# Patient Record
Sex: FEMALE | Race: WHITE | NOT HISPANIC OR LATINO | URBAN - NONMETROPOLITAN AREA
[De-identification: names, ages, dates, MRNs, and addresses within clinical notes are randomized per-mention and may not be internally consistent; named-entity substitution may affect disease eponyms.]

---

## 2017-01-13 ENCOUNTER — OFFICE VISIT (OUTPATIENT)
Dept: RETAIL CLINIC | Facility: CLINIC | Age: 37
End: 2017-01-13

## 2017-01-13 VITALS
WEIGHT: 146 LBS | SYSTOLIC BLOOD PRESSURE: 120 MMHG | RESPIRATION RATE: 16 BRPM | DIASTOLIC BLOOD PRESSURE: 80 MMHG | TEMPERATURE: 98.2 F | HEART RATE: 104 BPM | OXYGEN SATURATION: 98 %

## 2017-01-13 DIAGNOSIS — Z20.818 EXPOSURE TO STREP THROAT: ICD-10-CM

## 2017-01-13 DIAGNOSIS — K12.1 STOMATITIS: Primary | ICD-10-CM

## 2017-01-13 LAB
EXPIRATION DATE: NORMAL
INTERNAL CONTROL: NORMAL
Lab: NORMAL
S PYO AG THROAT QL: NEGATIVE

## 2017-01-13 PROCEDURE — 99213 OFFICE O/P EST LOW 20 MIN: CPT | Performed by: NURSE PRACTITIONER

## 2017-01-13 PROCEDURE — 87880 STREP A ASSAY W/OPTIC: CPT | Performed by: NURSE PRACTITIONER

## 2017-01-13 RX ORDER — NORETHINDRONE ACETATE AND ETHINYL ESTRADIOL 1; 5 MG/1; UG/1
TABLET ORAL DAILY
COMMUNITY

## 2017-01-13 NOTE — MR AVS SNAPSHOT
Joceline Montoya   1/13/2017 8:30 AM   Office Visit    Dept Phone:  [phone]   Encounter #:  [id number]    Provider:  PROVIDER BEC SOMERSET   Department:  [facility]                Your Full Care Plan              Your Updated Medication List          This list is accurate as of: 1/13/17  8:51 AM.  Always use your most recent med list.                METHIMAZOLE PO       MULTIVITAMIN ADULT PO       norethindrone-ethinyl estradiol 1-5 MG-MCG tablet   Commonly known as:  FEMHRT 1/5       SOLUBLE FIBER/PROBIOTICS PO       ZANTAC PO       ZYRTEC ALLERGY PO               You Were Diagnosed With        Codes Comments    Stomatitis    -  Primary ICD-10-CM: K12.1  ICD-9-CM: 528.00       Instructions    Stomatitis  Stomatitis is a condition that causes inflammation in your mouth. It can affect a part of your mouth or your whole mouth. The condition often affects your cheek, teeth, gums, lips, and tongue. Stomatitis can also affect the mucous membranes that surround your mouth (mucosa).  Pain from stomatitis can make it hard for you to eat or drink. Severe cases of this condition can lead to dehydration or poor nutrition.  CAUSES  Common causes of this condition include:  · Viruses, such as cold sores or oral herpes and shingles.  · Canker sores.  · Bacterial infections.  · Fungus or yeast infections, such as oral thrush.  · Not getting adequate nutrition.  · Injury to your mouth. This can be from:    Dentures or braces that do not fit well.    Biting your tongue or cheek.    Burning your mouth.    Having sharp or broken teeth.  · Gum disease.  · Using tobacco, especially chewing tobacco.  · Allergies to foods, medicines, or substances that are used in your mouth.  · Medicines, including cancer medicines (chemotherapy), antihistamines, and seizure medicines.  In some cases, the cause may not be known.  RISK FACTORS  This condition is more likely to develop in people who:  · Have poor oral  hygiene or poor nutrition.  · Have any condition that causes a dry mouth.  · Are under a lot of physical or emotional stress.  · Have any condition that weakens the body's defense system (immune system).  · Are being treated for cancer.  · Smoke.  SYMPTOMS  The most common symptoms of this condition are pain, swelling, and redness inside your mouth. The pain may feel like burning or stinging. It may get worse from eating or drinking. Other symptoms include:  · Painful, shallow sores (ulcers) in the mouth.  · Blisters in the mouth.  · Bleeding gums.  · Swollen gums.  · Irritability and fatigue.  · Bad breath.  · Bad taste in the mouth.  · Fever.  DIAGNOSIS  This condition is diagnosed with a physical exam to check for bleeding gums and mouth ulcers. You may also have other tests, including:  · Blood tests to look for infection or vitamin deficiencies.  · Mouth swab to get a fluid sample to test for bacteria (culture).  · Tissue sample from an ulcer to examine under a microscope (biopsy).  TREATMENT  Treatment for stomatitis depends on the cause. Treatment may include medicines, such as:  · Over-the counter (OTC) pain medicines.  · Topical anesthetic to numb the area if you have severe pain.  · Antibiotics to treat a bacterial infection.  · Antifungals to treat a fungal infection.  · Antivirals to treat a viral infection.  · Mouth rinses that contain steroids to reduce the swelling in your mouth.  · Other medicines to coat or numb your mouth.  HOME CARE INSTRUCTIONS  Medicines  · Take medicines only as directed by your health care provider.  · If you were prescribed an antibiotic, finish all of it even if you start to feel better.  Lifestyle  · Practice good oral hygiene:    Gently brush your teeth with a soft, nylon-bristled toothbrush two times each day.    Floss your teeth every day.    Have your teeth cleaned regularly, as recommended by your dentist.  · Eat a balanced diet. Do not eat:    Spicy foods.    Citrus,  such as oranges.    Foods that have sharp edges, such as chips.  · Avoid any foods or other allergens that you think may be causing your stomatitis.  · If you have dentures, make sure that they are properly fitted.  · Do not use any tobacco products, including cigarettes, chewing tobacco, or electronic cigarettes. If you need help quitting, ask your health care provider.  · Find ways to reduce stress. Try yoga or meditation. Ask your health care provider for other ideas.  General Instructions  · Use a salt-water rinse for pain as directed by your health care provider. Mix 1 tsp of salt in 2 cups of water.  · Drink enough fluid to keep your urine clear or pale yellow. This will keep you hydrated.  SEEK MEDICAL CARE IF:  · Your symptoms get worse.  · You develop new symptoms, especially:    A rash.    New symptoms that do not involve your mouth area.  · Your symptoms last longer than three weeks.  · Your stomatitis goes away and then returns.  · You have a harder time eating and drinking normally.  · You have increasing fatigue or weakness.  · You lose your appetite or you feel nauseous.  · You have a fever.     This information is not intended to replace advice given to you by your health care provider. Make sure you discuss any questions you have with your health care provider.     Document Released: 10/14/2008 Document Revised: 05/03/2016 Document Reviewed: 12/14/2015  [employer] Interactive Patient Education ©2016 [employer]       Patient Instructions History      Upcoming Appointments     Visit Type Date Time Department    OFFICE VISIT 1/13/2017  8:30 AM [facility]      [employer] Signup     [facility] [employer] allows you to send messages to your doctor, view your test results, renew your prescriptions, schedule appointments, and more. To sign up, go to [employer] and click on the Sign Up Now link in the New User? box. Enter your [employer] Activation Code exactly as it appears below along with  the last four digits of your Social Security Number and your Date of Birth () to complete the sign-up process. If you do not sign up before the expiration date, you must request a new code.    [employer] Activation Code: [email or web address]  Expires: 2017  8:51 AM    If you have questions, you can email [email or web address] or call [phone] to talk to our [employer] staff. Remember, [employer] is NOT to be used for urgent needs. For medical emergencies, dial 911.               Other Info from Your Visit           Allergies     Azithromycin  GI Intolerance    Bactrim [Sulfamethoxazole-trimethoprim]      Codeine      Morphine And Related        Reason for Visit     Sore Throat           Vital Signs     Blood Pressure Pulse Temperature Respirations    120/80 (BP Location: Left arm, Patient Position: Sitting, Cuff Size: Adult) 104 98.2 °F (36.8 °C) (Temporal Artery ) 16    Weight Last Menstrual Period Oxygen Saturation Smoking Status    146 lb (66.2 kg) 2016 98% Former Smoker      Problems and Diagnoses Noted     Stomatitis    -  Primary

## 2017-01-13 NOTE — PATIENT INSTRUCTIONS
Stomatitis  Stomatitis is a condition that causes inflammation in your mouth. It can affect a part of your mouth or your whole mouth. The condition often affects your cheek, teeth, gums, lips, and tongue. Stomatitis can also affect the mucous membranes that surround your mouth (mucosa).  Pain from stomatitis can make it hard for you to eat or drink. Severe cases of this condition can lead to dehydration or poor nutrition.  CAUSES  Common causes of this condition include:  · Viruses, such as cold sores or oral herpes and shingles.  · Canker sores.  · Bacterial infections.  · Fungus or yeast infections, such as oral thrush.  · Not getting adequate nutrition.  · Injury to your mouth. This can be from:    Dentures or braces that do not fit well.    Biting your tongue or cheek.    Burning your mouth.    Having sharp or broken teeth.  · Gum disease.  · Using tobacco, especially chewing tobacco.  · Allergies to foods, medicines, or substances that are used in your mouth.  · Medicines, including cancer medicines (chemotherapy), antihistamines, and seizure medicines.  In some cases, the cause may not be known.  RISK FACTORS  This condition is more likely to develop in people who:  · Have poor oral hygiene or poor nutrition.  · Have any condition that causes a dry mouth.  · Are under a lot of physical or emotional stress.  · Have any condition that weakens the body's defense system (immune system).  · Are being treated for cancer.  · Smoke.  SYMPTOMS  The most common symptoms of this condition are pain, swelling, and redness inside your mouth. The pain may feel like burning or stinging. It may get worse from eating or drinking. Other symptoms include:  · Painful, shallow sores (ulcers) in the mouth.  · Blisters in the mouth.  · Bleeding gums.  · Swollen gums.  · Irritability and fatigue.  · Bad breath.  · Bad taste in the mouth.  · Fever.  DIAGNOSIS  This condition is diagnosed with a physical exam to check for bleeding gums  and mouth ulcers. You may also have other tests, including:  · Blood tests to look for infection or vitamin deficiencies.  · Mouth swab to get a fluid sample to test for bacteria (culture).  · Tissue sample from an ulcer to examine under a microscope (biopsy).  TREATMENT  Treatment for stomatitis depends on the cause. Treatment may include medicines, such as:  · Over-the counter (OTC) pain medicines.  · Topical anesthetic to numb the area if you have severe pain.  · Antibiotics to treat a bacterial infection.  · Antifungals to treat a fungal infection.  · Antivirals to treat a viral infection.  · Mouth rinses that contain steroids to reduce the swelling in your mouth.  · Other medicines to coat or numb your mouth.  HOME CARE INSTRUCTIONS  Medicines  · Take medicines only as directed by your health care provider.  · If you were prescribed an antibiotic, finish all of it even if you start to feel better.  Lifestyle  · Practice good oral hygiene:    Gently brush your teeth with a soft, nylon-bristled toothbrush two times each day.    Floss your teeth every day.    Have your teeth cleaned regularly, as recommended by your dentist.  · Eat a balanced diet. Do not eat:    Spicy foods.    Citrus, such as oranges.    Foods that have sharp edges, such as chips.  · Avoid any foods or other allergens that you think may be causing your stomatitis.  · If you have dentures, make sure that they are properly fitted.  · Do not use any tobacco products, including cigarettes, chewing tobacco, or electronic cigarettes. If you need help quitting, ask your health care provider.  · Find ways to reduce stress. Try yoga or meditation. Ask your health care provider for other ideas.  General Instructions  · Use a salt-water rinse for pain as directed by your health care provider. Mix 1 tsp of salt in 2 cups of water.  · Drink enough fluid to keep your urine clear or pale yellow. This will keep you hydrated.  SEEK MEDICAL CARE IF:  · Your  symptoms get worse.  · You develop new symptoms, especially:    A rash.    New symptoms that do not involve your mouth area.  · Your symptoms last longer than three weeks.  · Your stomatitis goes away and then returns.  · You have a harder time eating and drinking normally.  · You have increasing fatigue or weakness.  · You lose your appetite or you feel nauseous.  · You have a fever.     This information is not intended to replace advice given to you by your health care provider. Make sure you discuss any questions you have with your health care provider.     Document Released: 10/14/2008 Document Revised: 05/03/2016 Document Reviewed: 12/14/2015  [employer] Interactive Patient Education ©2016 [employer]

## 2017-01-13 NOTE — PROGRESS NOTES
Viral Montoya is a 36 y.o. female.   Chief Complaint   Patient presents with   • Sore Throat     throat not really sore, but mouth is sore      Sore Throat    This is a new problem. The current episode started in the past 7 days. The problem has been unchanged. There has been no fever. Associated symptoms include headaches (intermittent). Pertinent negatives include no abdominal pain, congestion, coughing, diarrhea, ear pain, shortness of breath or vomiting. She has had exposure to strep (son has strep so she was concerned). She has tried nothing for the symptoms.        The following portions of the patient's history were reviewed and updated as appropriate: allergies, current medications, past family history, past medical history, past social history, past surgical history and problem list.    Current Outpatient Prescriptions:   •  Cetirizine HCl (ZYRTEC ALLERGY PO), Take  by mouth., Disp: , Rfl:   •  METHIMAZOLE PO, Take  by mouth., Disp: , Rfl:   •  Multiple Vitamins-Minerals (MULTIVITAMIN ADULT PO), Take  by mouth., Disp: , Rfl:   •  norethindrone-ethinyl estradiol (FEMHRT 1/5) 1-5 MG-MCG tablet, Take  by mouth Daily., Disp: , Rfl:   •  Probiotic Product (SOLUBLE FIBER/PROBIOTICS PO), Take  by mouth., Disp: , Rfl:   •  RaNITidine HCl (ZANTAC PO), Take  by mouth., Disp: , Rfl:   •  diphenhydrAMINE 12.5 MG/5ML elixir 20 mL, aluminum-magnesium hydroxide-simethicone 400-400-40 MG/5ML suspension 20 mL, lidocaine viscous 2 % solution 20 mL, Swish and spit 10 mL Every 6 (Six) Hours As Needed for stomatitis., Disp: 240 mL, Rfl: 0    Review of Systems   Constitutional: Negative for appetite change, chills, fatigue, fever and unexpected weight change.   HENT: Negative for congestion, ear pain, sinus pressure, sore throat (mouth soreness without lesion) and voice change.    Respiratory: Negative for cough and shortness of breath.    Cardiovascular: Negative for chest pain.   Gastrointestinal: Negative for  abdominal pain, diarrhea, nausea and vomiting.   Musculoskeletal: Negative for myalgias.   Skin: Negative for rash.   Neurological: Positive for headaches (intermittent).     Visit Vitals   • /80 (BP Location: Left arm, Patient Position: Sitting, Cuff Size: Adult)   • Pulse 104   • Temp 98.2 °F (36.8 °C) (Temporal Artery )   • Resp 16   • Wt 146 lb (66.2 kg)   • LMP 11/14/2016   • SpO2 98%       Objective   Allergies   Allergen Reactions   • Azithromycin GI Intolerance   • Bactrim [Sulfamethoxazole-Trimethoprim]    • Codeine    • Morphine And Related        Physical Exam   Constitutional: She is oriented to person, place, and time. She appears well-developed and well-nourished. She is cooperative. She does not appear ill. No distress.   HENT:   Head: Normocephalic and atraumatic.   Right Ear: Tympanic membrane, external ear and ear canal normal.   Left Ear: Tympanic membrane, external ear and ear canal normal.   Nose: Nose normal.   Mouth/Throat: Uvula is midline and oropharynx is clear and moist. Mucous membranes are pale and dry.   Tongue dry with thin white film (does not appear like classic thrush).  Intact. No noted oral lesions   Eyes: Conjunctivae, EOM and lids are normal.   Cardiovascular: Normal rate and regular rhythm.    Pulmonary/Chest: Effort normal and breath sounds normal. No respiratory distress.   Abdominal: Soft. Normal appearance and bowel sounds are normal. There is no tenderness.   Lymphadenopathy:     She has no cervical adenopathy.        Right: No supraclavicular adenopathy present.        Left: No supraclavicular adenopathy present.   Neurological: She is alert and oriented to person, place, and time.   Skin: Skin is warm and dry. No rash noted.       Assessment/Plan   Joceline was seen today for sore throat.    Diagnoses and all orders for this visit:    Stomatitis  -     POC Rapid Strep A    Exposure to strep throat  -     POC Rapid Strep A    Other orders  -     diphenhydrAMINE 12.5  MG/5ML elixir 20 mL, aluminum-magnesium hydroxide-simethicone 400-400-40 MG/5ML suspension 20 mL, lidocaine viscous 2 % solution 20 mL; Swish and spit 10 mL Every 6 (Six) Hours As Needed for stomatitis.

## 2023-07-20 ENCOUNTER — TRANSCRIBE ORDERS (OUTPATIENT)
Dept: LAB | Facility: HOSPITAL | Age: 43
End: 2023-07-20
Payer: COMMERCIAL

## 2023-07-20 ENCOUNTER — LAB (OUTPATIENT)
Dept: LAB | Facility: HOSPITAL | Age: 43
End: 2023-07-20
Payer: COMMERCIAL

## 2023-07-20 DIAGNOSIS — N89.8 OLD VAGINAL LACERATION: Primary | ICD-10-CM

## 2023-07-20 DIAGNOSIS — N89.8 OLD VAGINAL LACERATION: ICD-10-CM

## 2023-07-20 PROCEDURE — 87086 URINE CULTURE/COLONY COUNT: CPT

## 2023-07-22 LAB — BACTERIA SPEC AEROBE CULT: ABNORMAL

## 2023-08-29 ENCOUNTER — TRANSCRIBE ORDERS (OUTPATIENT)
Dept: LAB | Facility: HOSPITAL | Age: 43
End: 2023-08-29
Payer: COMMERCIAL

## 2023-08-29 ENCOUNTER — LAB (OUTPATIENT)
Dept: LAB | Facility: HOSPITAL | Age: 43
End: 2023-08-29
Payer: COMMERCIAL

## 2023-08-29 DIAGNOSIS — R30.0 DYSURIA: Primary | ICD-10-CM

## 2023-08-29 DIAGNOSIS — R30.0 DYSURIA: ICD-10-CM

## 2023-08-29 PROCEDURE — 87086 URINE CULTURE/COLONY COUNT: CPT

## 2023-08-30 LAB — BACTERIA SPEC AEROBE CULT: NO GROWTH

## 2024-04-26 ENCOUNTER — TRANSCRIBE ORDERS (OUTPATIENT)
Dept: ADMINISTRATIVE | Facility: HOSPITAL | Age: 44
End: 2024-04-26
Payer: COMMERCIAL

## 2024-04-26 ENCOUNTER — OFFICE VISIT (OUTPATIENT)
Dept: ENDOCRINOLOGY | Facility: CLINIC | Age: 44
End: 2024-04-26
Payer: COMMERCIAL

## 2024-04-26 VITALS
BODY MASS INDEX: 34.2 KG/M2 | DIASTOLIC BLOOD PRESSURE: 74 MMHG | HEART RATE: 97 BPM | WEIGHT: 193 LBS | HEIGHT: 63 IN | SYSTOLIC BLOOD PRESSURE: 130 MMHG

## 2024-04-26 DIAGNOSIS — Z12.31 SCREENING MAMMOGRAM FOR BREAST CANCER: Primary | ICD-10-CM

## 2024-04-26 DIAGNOSIS — E04.1 SOLITARY THYROID NODULE: Primary | ICD-10-CM

## 2024-04-26 DIAGNOSIS — E05.90 HYPERTHYROIDISM: ICD-10-CM

## 2024-04-26 RX ORDER — SODIUM CAPRYLATE
POWDER (GRAM) MISCELLANEOUS
COMMUNITY
End: 2024-04-26

## 2024-04-26 RX ORDER — UBROGEPANT 100 MG/1
100 TABLET ORAL AS NEEDED
COMMUNITY
Start: 2024-03-28

## 2024-04-26 NOTE — PROGRESS NOTES
"Thyroid Problem    Subjective    Joceline Montoya is a 43 y.o. female. she is being seen for consultation today at the request of  Sahara Durant A* for evaluation of hyperthyroidism  Patient is a 43 y.o. female  presented with palpitations in 2015. She was dx with Graves disease. Started methimazole and BB. Over the years methimazole was slowly decreased to 2.5 mg every other day. Every time she stopped thyroid function increased. She is off the methimazole since 2022.   Labs were done in 4/16/24 TSH was 1.38 and free T4 1.1  She had ultrasound and small thyroid nodule was seen.     C/o weight gain, intermittent skin lesions, migraines, irregular menses.      Review of Systems  Review of Systems   Constitutional:  Positive for diaphoresis and fatigue.   HENT:  Positive for congestion, postnasal drip, rhinorrhea and sinus pressure.    Eyes:  Positive for photophobia.   Genitourinary:  Positive for difficulty urinating and vaginal pain.   Skin:         Intermittent rashes. Hair loss   Allergic/Immunologic: Positive for environmental allergies.   Neurological:  Positive for headache.     Current medications:  Current Outpatient Medications   Medication Sig Dispense Refill    Cetirizine HCl (ZYRTEC ALLERGY PO) Take  by mouth.      Multiple Vitamins-Minerals (MULTIVITAMIN ADULT PO) Take  by mouth.      Probiotic Product (SOLUBLE FIBER/PROBIOTICS PO) Take  by mouth.      terconazole (TERAZOL 7) 0.4 % vaginal cream Daily.      Ubrelvy 100 MG tablet Take 1 tablet by mouth As Needed.       No current facility-administered medications for this visit.         Objective      Vitals:    04/26/24 0947   BP: 130/74   Pulse: 97   Weight: 87.5 kg (193 lb)   Height: 160 cm (63\")   Body mass index is 34.19 kg/m².  Physical Exam  Vitals reviewed.   Constitutional:       Appearance: Normal appearance.   Neck:      Thyroid: No thyromegaly.   Cardiovascular:      Rate and Rhythm: Normal rate and regular rhythm.      Pulses: Normal " pulses.      Heart sounds: Normal heart sounds.   Pulmonary:      Effort: Pulmonary effort is normal.      Breath sounds: Normal breath sounds.   Musculoskeletal:         General: No swelling.   Neurological:      Mental Status: She is alert and oriented to person, place, and time.   Psychiatric:         Mood and Affect: Mood normal.         Thought Content: Thought content normal.         LABS AND IMAGING  Thyroid ultrasound 04/26/24        Real time high resolution imaging of the thyroid gland was performed in transverse and longitudinal planes.   Previous images were reviewed and compared to the current appearance to assess stability.       The right lobe measured 4.48 cm L x 1.8 cm AP x 1.81 cm in TV dimension.    The isthmus measured 0.25 cm in thickness.    The left thyroid lobe measured 4.26 cm L x 1.36 cm AP x 1.23 cm in TV dimension.    Thyroid gland is homogeneous and contains no nodules.    There is a small 4 mm cyst on the posterior portion of the right lobe.  No dominant nodules otherwise      No pathologic lymph nodes were seen.      Assessment: Normal thyroid gland with 4 mm cystic nodule.  No need for routine ultrasound    1. Solitary thyroid nodule    2. Hyperthyroidism        Assessment & Plan    (E04.1) Solitary thyroid nodule - Plan: US Thyroid    (E05.90) Hyperthyroidism       PLAN  -  Hyperthyroidism due to Graves disease in remission -recent thyroid function test was normal.  Will continue monitoring on a yearly basis.    -History of thyroid nodule.  I have performed thyroid ultrasound today and no dominant nodules are seen.  There is a 4 mm cyst in one of the lobes otherwise thyroid is unremarkable.  There is no need for routine repeat ultrasounds.     - diagnosis was discussed with the patient, and her questions were answered.     - old records reviewed and summarized in the HPI portion of the note.       Return in about 1 year (around 4/26/2025) for 15 min appointment.           Selena URBAN  MD [place]

## 2024-07-02 LAB
NCCN CRITERIA FLAG: ABNORMAL
TYRER CUZICK SCORE: 14.9

## 2024-07-12 ENCOUNTER — HOSPITAL ENCOUNTER (OUTPATIENT)
Dept: MAMMOGRAPHY | Facility: HOSPITAL | Age: 44
Discharge: HOME | End: 2024-07-12
Admitting: STUDENT IN AN ORGANIZED HEALTH CARE EDUCATION/TRAINING PROGRAM
Payer: COMMERCIAL

## 2024-07-12 DIAGNOSIS — Z12.31 SCREENING MAMMOGRAM FOR BREAST CANCER: ICD-10-CM

## 2024-07-12 PROCEDURE — 77063 BREAST TOMOSYNTHESIS BI: CPT

## 2024-07-12 PROCEDURE — 77067 SCR MAMMO BI INCL CAD: CPT

## 2024-07-25 ENCOUNTER — HOSPITAL ENCOUNTER (OUTPATIENT)
Facility: HOSPITAL | Age: 44
Discharge: HOME | End: 2024-07-25
Payer: COMMERCIAL

## 2024-07-25 ENCOUNTER — HOSPITAL ENCOUNTER (OUTPATIENT)
Facility: HOSPITAL | Age: 44
Discharge: HOME | End: 2024-07-25
Admitting: RADIOLOGY
Payer: COMMERCIAL

## 2024-07-25 DIAGNOSIS — R92.8 ABNORMAL MAMMOGRAM: ICD-10-CM

## 2024-07-25 PROCEDURE — 77065 DX MAMMO INCL CAD UNI: CPT

## 2024-07-25 PROCEDURE — 76642 ULTRASOUND BREAST LIMITED: CPT

## 2024-07-25 PROCEDURE — G0279 TOMOSYNTHESIS, MAMMO: HCPCS

## 2025-05-27 ENCOUNTER — OFFICE VISIT (OUTPATIENT)
Dept: ENDOCRINOLOGY | Facility: CLINIC | Age: 45
End: 2025-05-27
Payer: COMMERCIAL

## 2025-05-27 VITALS
SYSTOLIC BLOOD PRESSURE: 128 MMHG | OXYGEN SATURATION: 98 % | HEART RATE: 84 BPM | HEIGHT: 63 IN | BODY MASS INDEX: 34.5 KG/M2 | DIASTOLIC BLOOD PRESSURE: 72 MMHG | WEIGHT: 194.7 LBS

## 2025-05-27 DIAGNOSIS — R43.2 TASTE SENSE ALTERED: ICD-10-CM

## 2025-05-27 DIAGNOSIS — L65.9 HAIR LOSS: ICD-10-CM

## 2025-05-27 DIAGNOSIS — E05.90 HYPERTHYROIDISM: Primary | ICD-10-CM

## 2025-05-27 LAB
25(OH)D3 SERPL-MCNC: 41.4 NG/ML (ref 30–100)
ALBUMIN SERPL-MCNC: 4.5 G/DL (ref 3.5–5.2)
ALBUMIN/GLOB SERPL: 1.2 G/DL
ALP SERPL-CCNC: 171 U/L (ref 39–117)
ALT SERPL W P-5'-P-CCNC: 19 U/L (ref 1–33)
ANION GAP SERPL CALCULATED.3IONS-SCNC: 14 MMOL/L (ref 5–15)
AST SERPL-CCNC: 26 U/L (ref 1–32)
BILIRUB SERPL-MCNC: 0.5 MG/DL (ref 0–1.2)
BUN SERPL-MCNC: 8 MG/DL (ref 6–20)
BUN/CREAT SERPL: 13.8 (ref 7–25)
CALCIUM SPEC-SCNC: 9.7 MG/DL (ref 8.6–10.5)
CHLORIDE SERPL-SCNC: 102 MMOL/L (ref 98–107)
CHOLEST SERPL-MCNC: 199 MG/DL (ref 0–200)
CO2 SERPL-SCNC: 24 MMOL/L (ref 22–29)
CREAT SERPL-MCNC: 0.58 MG/DL (ref 0.57–1)
EGFRCR SERPLBLD CKD-EPI 2021: 113.2 ML/MIN/1.73
GLOBULIN UR ELPH-MCNC: 3.9 GM/DL
GLUCOSE SERPL-MCNC: 76 MG/DL (ref 65–99)
HBA1C MFR BLD: 4.8 % (ref 4.8–5.6)
HDLC SERPL-MCNC: 50 MG/DL (ref 40–60)
IRON 24H UR-MRATE: 82 MCG/DL (ref 37–145)
IRON SATN MFR SERPL: 17 % (ref 20–50)
LDLC SERPL CALC-MCNC: 131 MG/DL (ref 0–100)
LDLC/HDLC SERPL: 2.58 {RATIO}
POTASSIUM SERPL-SCNC: 4.1 MMOL/L (ref 3.5–5.2)
PROT SERPL-MCNC: 8.4 G/DL (ref 6–8.5)
SODIUM SERPL-SCNC: 140 MMOL/L (ref 136–145)
T4 FREE SERPL-MCNC: 1.29 NG/DL (ref 0.92–1.68)
TIBC SERPL-MCNC: 483 MCG/DL (ref 298–536)
TRANSFERRIN SERPL-MCNC: 324 MG/DL (ref 200–360)
TRIGL SERPL-MCNC: 99 MG/DL (ref 0–150)
TSH SERPL DL<=0.05 MIU/L-ACNC: 1.59 UIU/ML (ref 0.27–4.2)
VIT B12 BLD-MCNC: 745 PG/ML (ref 211–946)
VLDLC SERPL-MCNC: 18 MG/DL (ref 5–40)

## 2025-05-27 PROCEDURE — 82306 VITAMIN D 25 HYDROXY: CPT | Performed by: INTERNAL MEDICINE

## 2025-05-27 PROCEDURE — 82607 VITAMIN B-12: CPT | Performed by: INTERNAL MEDICINE

## 2025-05-27 PROCEDURE — 83540 ASSAY OF IRON: CPT | Performed by: INTERNAL MEDICINE

## 2025-05-27 PROCEDURE — 80053 COMPREHEN METABOLIC PANEL: CPT | Performed by: INTERNAL MEDICINE

## 2025-05-27 PROCEDURE — 84443 ASSAY THYROID STIM HORMONE: CPT | Performed by: INTERNAL MEDICINE

## 2025-05-27 PROCEDURE — 84439 ASSAY OF FREE THYROXINE: CPT | Performed by: INTERNAL MEDICINE

## 2025-05-27 PROCEDURE — 84466 ASSAY OF TRANSFERRIN: CPT | Performed by: INTERNAL MEDICINE

## 2025-05-27 PROCEDURE — 99214 OFFICE O/P EST MOD 30 MIN: CPT | Performed by: INTERNAL MEDICINE

## 2025-05-27 PROCEDURE — 83036 HEMOGLOBIN GLYCOSYLATED A1C: CPT | Performed by: INTERNAL MEDICINE

## 2025-05-27 PROCEDURE — 80061 LIPID PANEL: CPT | Performed by: INTERNAL MEDICINE

## 2025-05-27 NOTE — PROGRESS NOTES
"Hyperthyroidism    Subjective    Joceline Montoya is a 44 y.o. female. she is being seen for follow-up of hyperthyroidism  Patient is a 44 y.o. female  presented with palpitations in 2015. She was dx with Graves disease. Started methimazole and BB. Over the years methimazole was slowly decreased to 2.5 mg every other day. Every time she stopped thyroid function increased. She is off the methimazole since 2022.   Labs were done in 4/16/24 TSH was 1.38 and free T4 1.1  She had ultrasound and small thyroid nodule was seen.     C/o weight gain, dry skin and mucous membranes, hair loss.   She was found to have elevated LFT/CONRAD. She is exercising regularly and follows the diet, but still not able to lose weight.        Review of Systems  Review of Systems   Constitutional:  Positive for diaphoresis, fatigue and unexpected weight gain.   Eyes:  Positive for photophobia.   Genitourinary:  Positive for difficulty urinating and vaginal pain.   Skin:  Positive for dry skin.        Intermittent rashes. Hair loss   Allergic/Immunologic: Positive for environmental allergies.   Neurological:  Positive for headache.     Current medications:  Current Outpatient Medications   Medication Sig Dispense Refill    Cetirizine HCl (ZYRTEC ALLERGY PO) Take  by mouth.      Multiple Vitamins-Minerals (MULTIVITAMIN ADULT PO) Take  by mouth.      Probiotic Product (SOLUBLE FIBER/PROBIOTICS PO) Take  by mouth.      terconazole (TERAZOL 7) 0.4 % vaginal cream Daily.      Ubrelvy 100 MG tablet Take 1 tablet by mouth As Needed.       No current facility-administered medications for this visit.         Objective      Vitals:    05/27/25 1443   BP: 128/72   BP Location: Left arm   Patient Position: Sitting   Cuff Size: Adult   Pulse: 84   SpO2: 98%   Weight: 88.3 kg (194 lb 11.2 oz)   Height: 160 cm (63\")   Body mass index is 34.49 kg/m².  Physical Exam  Vitals reviewed.   Constitutional:       Appearance: Normal appearance.   Neck:      Thyroid: No " thyromegaly.   Cardiovascular:      Rate and Rhythm: Normal rate and regular rhythm.      Pulses: Normal pulses.      Heart sounds: Normal heart sounds.   Pulmonary:      Effort: Pulmonary effort is normal.      Breath sounds: Normal breath sounds.   Musculoskeletal:         General: No swelling.   Neurological:      Mental Status: She is alert and oriented to person, place, and time.   Psychiatric:         Mood and Affect: Mood normal.         Thought Content: Thought content normal.         LABS AND IMAGING  Thyroid ultrasound 04/26/24        Real time high resolution imaging of the thyroid gland was performed in transverse and longitudinal planes.   Previous images were reviewed and compared to the current appearance to assess stability.       The right lobe measured 4.48 cm L x 1.8 cm AP x 1.81 cm in TV dimension.    The isthmus measured 0.25 cm in thickness.    The left thyroid lobe measured 4.26 cm L x 1.36 cm AP x 1.23 cm in TV dimension.    Thyroid gland is homogeneous and contains no nodules.    There is a small 4 mm cyst on the posterior portion of the right lobe.  No dominant nodules otherwise      No pathologic lymph nodes were seen.      Assessment: Normal thyroid gland with 4 mm cystic nodule.  No need for routine ultrasound    1. Hyperthyroidism    2. Hair loss    3. Taste sense altered          Assessment & Plan    (E05.90) Hyperthyroidism - Plan: TSH, T4, Free, Vitamin D,25-Hydroxy, Vitamin B12, Comprehensive Metabolic Panel, Iron Profile w/o Ferritin, Hemoglobin A1c, Lipid Panel    (L65.9) Hair loss - Plan: Hemoglobin A1c, Lipid Panel    (R43.2) Taste sense altered - Plan: Hemoglobin A1c, Lipid Panel       PLAN  -  Hyperthyroidism due to Graves disease in remission -repeat thyroid function test. She developed a lot of new symptoms in the last year and some of the sx could be related to the thyroid.     -History of thyroid nodule.  Last u/s showed a 4 mm cyst in one of the lobes otherwise thyroid is  unremarkable.  There is no need for routine repeat ultrasounds.     - I have ordered comprehensive test to evaluate the cause of her multiple symptoms.     Return in about 1 year (around 5/27/2026) for 15 min appointment.           Selena Oliver MD